# Patient Record
(demographics unavailable — no encounter records)

---

## 2024-12-17 NOTE — HISTORY OF PRESENT ILLNESS
[de-identified] : The patient is s/p L knee arthroscopic ACLR with quadriceps tendon autograft with internal brace.  Date of Surgery: 03/29/24 Pain:    At Rest: 0/10  With Activity:  0/10  Mechanism of injury: while running on grass during a lacrosse game and felt a pop This is not a Work Related Injury being treated under Worker's Compensation. This is not an athletic injury occurring associated with an interscholastic or organized sports team. Treatment/Imaging/Studies Since Last Visit: PT at [facility] 	Reports Available For Review Today:  Out of work/sport: _, since _ School/Sport/Position/Occupation: [employer], [employer]  Change since last visit:  patient completes PT today  Additional Information: None

## 2024-12-17 NOTE — ADDENDUM
[FreeTextEntry1] : Documented by Yoni Vyas acting as a scribe for Dr. Lockett and Farhad Wilcox PA-C on 12/17/2024 and was presence for the following sections: Physical Exam; Data Reviewed; Assessment; Discussion/Summary. All medical record entries made by the Scribe were at my, Dr. Lockett, and Farhad Wilcox, direction and personally dictated by me on 12/17/2024. I have reviewed the chart and agree that the record accurately reflects my personal performance of the history, physical exam, procedure and imaging.

## 2024-12-17 NOTE — DISCUSSION/SUMMARY
[de-identified] : Patient progressing well with post-operative care. Patient cleared for all sport and activity. Patient advised to ease into activity. Patient advised to wear brace during sport. Patient will follow up as needed.     ----------------------------------------------- Home Exercise The patient is instructed on a home exercise program.  SELENA CAMACHO Acting as a Scribe for Dr. Cathryn BRADSHAW, Selena Camacho, attest that this documentation has been prepared under the direction and in the presence of Provider Dr. Lockett.  Activity Modification The patient was advised to modify their activities.  Dx / Natural History The patient was advised of the diagnosis. The natural history of the pathology was explained in full to the patient in layman's terms. Several different treatment options were discussed and explained in full to the patient including the risks and benefits of both surgical and non-surgical treatments.  All questions and concerns were answered.  Pain Guide Activities The patient was advised to let pain guide the gradual advancement of activities.  RICE I explained to the patient that rest, ice, compression, and elevation would benefit them. They may return to activity after follow-up or when they no longer have any pain.  The patient's current medication management of their orthopedic diagnosis was reviewed today: (1) We discussed a comprehensive treatment plan that included possible pharmaceutical management involving the use of prescription strength medications including but not limited to options such as oral Naprosyn 500mg BID, once daily Meloxicam 15 mg, or 500-650 mg Tylenol versus over the counter oral medications and topical prescription NSAID Pennsaid vs over the counter Voltaren gel. (2) There is a moderate risk of morbidity with further treatment, especially from use of prescription strength medications and possible side effects of these medications which include upset stomach with oral medications, skin reactions to topical medications and cardiac/renal issues with long term use. (3) I recommended that the patient follow-up with their medical physician to discuss any significant specific potential issues with long term medication use such as interactions with current medications or with exacerbation of underlying medical comorbidities. (4) The benefits and risks associated with use of injectable, oral or topical, prescription and over the counter anti-inflammatory medications were discussed with the patient. The patient voiced understanding of the risks including but not limited to bleeding, stroke, kidney dysfunction, heart disease, and were referred to the black box warning label for further information.

## 2025-07-12 NOTE — DISCUSSION/SUMMARY
[de-identified] :  patient is progressing well with post op care. Reviewed all images with patient. copy of images was provided. Physical therapy prescribed for strengthening and stretching. Patient will follow up in 6 weeks.     ----------------------------------------------- Home Exercise The patient is instructed on a home exercise program.   JOELLE DRIVER Acting as a Scribe for Dr. Cathryn BRADSHAW, Joelle Driver, attest that this documentation has been prepared under the direction and in the presence of Provider Jeremias Lockett MD.   Activity Modification The patient was advised to modify their activities.   Dx / Natural History The patient was advised of the diagnosis. The natural history of the pathology was explained in full to the patient in layman's terms. Several different treatment options were discussed and explained in full to the patient including the risks and benefits of both surgical and non-surgical treatments. All questions and concerns were answered.   Pain Guide Activities The patient was advised to let pain guide the gradual advancement of activities.   ZAYDA BRADSHAW explained to the patient that rest, ice, compression, and elevation would benefit them. They may return to activity after follow-up or when they no longer have any pain.   The patient's current medication management of their orthopedic diagnosis was reviewed today: (1) We discussed a comprehensive treatment plan that included possible pharmaceutical management involving the use of prescription strength medications including but not limited to options such as oral Naprosyn 500mg BID, once daily Meloxicam 15 mg, or 500-650 mg Tylenol versus over the counter oral medications and topical prescription NSAID Pennsaid vs over the counter Voltaren gel. (2) There is a moderate risk of morbidity with further treatment, especially from use of prescription strength medications and possible side effects of these medications which include upset stomach with oral medications, skin reactions to topical medications and cardiac/renal issues with long term use. (3) I recommended that the patient follow-up with their medical physician to discuss any significant specific potential issues with long term medication use such as interactions with current medications or with exacerbation of underlying medical comorbidities. (4) The benefits and risks associated with use of injectable, oral or topical, prescription and over the counter anti-inflammatory medications were discussed with the patient. The patient voiced understanding of the risks including but not limited to bleeding, stroke, kidney dysfunction, heart disease, and were referred to the black box warning label for further information.

## 2025-07-12 NOTE — PHYSICAL EXAM
[de-identified] : Date of Surgery: 06/30/25  Neurologic: normal sensation, normal mood and affect, orientated and able to communicate   Constitutional: well developed and well nourished    Right Knee: No effusion, clean and dry incisions, intact skin, no fluctuance, no sign of infection, no wound erythema, no induration, no drainage, sutures removed, steri-strips applied.

## 2025-07-12 NOTE — HISTORY OF PRESENT ILLNESS
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 She walked off the field and applied ice. This is NOT a Work Related Injury being treated under Worker's Compensation. This is IS an athletic injury occurring associated with an interscholastic or organized sports team. Treatment/Imaging/Studies Since Last Visit: _ 	Reports Available For Review Today: Surgery Out of work/sport: _, since _ School/Sport/Position/Occupation:_ Change since last visit:  Additional Information: None

## 2025-07-12 NOTE — HISTORY OF PRESENT ILLNESS
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 She walked off the field and applied ice. This is NOT a Work Related Injury being treated under Worker's Compensation. This is IS an athletic injury occurring associated with an interscholastic or organized sports team. Treatment/Imaging/Studies Since Last Visit: _ 	Reports Available For Review Today: Surgery Out of work/sport: _, since _ School/Sport/Position/Occupation:_ Change since last visit:  Additional Information: None

## 2025-07-12 NOTE — PHYSICAL EXAM
[de-identified] : Date of Surgery: 06/30/25  Neurologic: normal sensation, normal mood and affect, orientated and able to communicate   Constitutional: well developed and well nourished    Right Knee: No effusion, clean and dry incisions, intact skin, no fluctuance, no sign of infection, no wound erythema, no induration, no drainage, sutures removed, steri-strips applied.

## 2025-07-12 NOTE — DISCUSSION/SUMMARY
[de-identified] :  patient is progressing well with post op care. Reviewed all images with patient. copy of images was provided. Physical therapy prescribed for strengthening and stretching. Patient will follow up in 6 weeks.     ----------------------------------------------- Home Exercise The patient is instructed on a home exercise program.   JOELLE DRIVER Acting as a Scribe for Dr. Cathryn BRADSHAW, Joelle Driver, attest that this documentation has been prepared under the direction and in the presence of Provider Jeremisa Lockett MD.   Activity Modification The patient was advised to modify their activities.   Dx / Natural History The patient was advised of the diagnosis. The natural history of the pathology was explained in full to the patient in layman's terms. Several different treatment options were discussed and explained in full to the patient including the risks and benefits of both surgical and non-surgical treatments. All questions and concerns were answered.   Pain Guide Activities The patient was advised to let pain guide the gradual advancement of activities.   ZAYDA BRADSHAW explained to the patient that rest, ice, compression, and elevation would benefit them. They may return to activity after follow-up or when they no longer have any pain.   The patient's current medication management of their orthopedic diagnosis was reviewed today: (1) We discussed a comprehensive treatment plan that included possible pharmaceutical management involving the use of prescription strength medications including but not limited to options such as oral Naprosyn 500mg BID, once daily Meloxicam 15 mg, or 500-650 mg Tylenol versus over the counter oral medications and topical prescription NSAID Pennsaid vs over the counter Voltaren gel. (2) There is a moderate risk of morbidity with further treatment, especially from use of prescription strength medications and possible side effects of these medications which include upset stomach with oral medications, skin reactions to topical medications and cardiac/renal issues with long term use. (3) I recommended that the patient follow-up with their medical physician to discuss any significant specific potential issues with long term medication use such as interactions with current medications or with exacerbation of underlying medical comorbidities. (4) The benefits and risks associated with use of injectable, oral or topical, prescription and over the counter anti-inflammatory medications were discussed with the patient. The patient voiced understanding of the risks including but not limited to bleeding, stroke, kidney dysfunction, heart disease, and were referred to the black box warning label for further information.